# Patient Record
Sex: MALE | Race: OTHER | HISPANIC OR LATINO | URBAN - METROPOLITAN AREA
[De-identification: names, ages, dates, MRNs, and addresses within clinical notes are randomized per-mention and may not be internally consistent; named-entity substitution may affect disease eponyms.]

---

## 2017-11-23 ENCOUNTER — EMERGENCY (EMERGENCY)
Facility: HOSPITAL | Age: 22
LOS: 1 days | Discharge: HOME | End: 2017-11-23
Attending: EMERGENCY MEDICINE
Payer: SELF-PAY

## 2017-11-23 VITALS
HEART RATE: 87 BPM | TEMPERATURE: 100 F | RESPIRATION RATE: 18 BRPM | SYSTOLIC BLOOD PRESSURE: 117 MMHG | DIASTOLIC BLOOD PRESSURE: 67 MMHG | OXYGEN SATURATION: 100 %

## 2017-11-23 VITALS
DIASTOLIC BLOOD PRESSURE: 54 MMHG | HEIGHT: 66 IN | WEIGHT: 143.08 LBS | OXYGEN SATURATION: 100 % | HEART RATE: 92 BPM | TEMPERATURE: 100 F | SYSTOLIC BLOOD PRESSURE: 118 MMHG | RESPIRATION RATE: 18 BRPM

## 2017-11-23 PROCEDURE — 99284 EMERGENCY DEPT VISIT MOD MDM: CPT

## 2017-11-23 PROCEDURE — 71020: CPT | Mod: 26

## 2017-11-23 PROCEDURE — 99283 EMERGENCY DEPT VISIT LOW MDM: CPT | Mod: 25

## 2017-11-23 PROCEDURE — 71046 X-RAY EXAM CHEST 2 VIEWS: CPT

## 2017-11-23 RX ORDER — IBUPROFEN 200 MG
600 TABLET ORAL ONCE
Qty: 0 | Refills: 0 | Status: COMPLETED | OUTPATIENT
Start: 2017-11-23 | End: 2017-11-23

## 2017-11-23 RX ADMIN — Medication 600 MILLIGRAM(S): at 10:20

## 2017-11-23 RX ADMIN — Medication 600 MILLIGRAM(S): at 09:50

## 2017-11-23 RX ADMIN — Medication 75 MILLIGRAM(S): at 11:07

## 2017-11-23 NOTE — ED PROVIDER NOTE - OBJECTIVE STATEMENT
21 y/o M pt with no significant PMHx presents to ED c/o productive cough x 1 yesterday; fever (100.7), vomiting (4-5 episodes), throat pain, rhinorrhea, and body aches x today.  Pt states that he took Theraflu last night with mild symptomatic relief. Pt reports that he did not have his flu-shot this year. Pt also reports sick contacts at home. Pt denies abd pain, diarrhea, HA, rash, or any other complaints. NKDA.

## 2017-11-23 NOTE — ED ADULT NURSE NOTE - OBJECTIVE STATEMENT
pt a&ox3, here with c/o cough, fever, and sore throat since yesterday. pt also c/o body aches. states cough with yellow phlegm. vomiting this morning. denies abdominal pain.

## 2017-11-23 NOTE — ED PROVIDER NOTE - MUSCULOSKELETAL, MLM
Spine appears normal, range of motion is not limited, no muscle or joint tenderness. No calf tenderness.

## 2017-11-23 NOTE — ED PROVIDER NOTE - ENMT, MLM
Airway patent, Nasal mucosa clear. Mouth with normal mucosa. Throat has no vesicles, no oropharyngeal exudates or erythema and uvula is midline. Sinus nontender to percussion.

## 2021-03-31 NOTE — ED ADULT NURSE NOTE - NSSISCREENINGQ3_ED_A_ED
Patient in IR suite 2 for procedure. Anesthesia has assumed care of patient for the duration of procedure. Please see anesthesia record for documentation.     IR Nurse Pre-Procedure Checklist Part 2          Consent signed: Yes    H&P complete:  Yes    Antibiotics: Not applicable    Airway/Mallampati Done: Yes    Shaved: Yes    Pregnancy Form:Not applicable    Patient Position: Yes    MD Side: Yes     Biopsy Worksheet: Not applicable    Specimen Medium: Not applicable
(4) rarely moist
No